# Patient Record
Sex: FEMALE | Race: WHITE | Employment: STUDENT | URBAN - METROPOLITAN AREA
[De-identification: names, ages, dates, MRNs, and addresses within clinical notes are randomized per-mention and may not be internally consistent; named-entity substitution may affect disease eponyms.]

---
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
## 2021-04-13 ENCOUNTER — OFFICE VISIT (OUTPATIENT)
Dept: FAMILY MEDICINE CLINIC | Age: 21
End: 2021-04-13
Payer: COMMERCIAL

## 2021-04-13 VITALS
HEIGHT: 68 IN | WEIGHT: 165 LBS | DIASTOLIC BLOOD PRESSURE: 80 MMHG | OXYGEN SATURATION: 99 % | SYSTOLIC BLOOD PRESSURE: 120 MMHG | HEART RATE: 83 BPM | BODY MASS INDEX: 25.01 KG/M2

## 2021-04-13 DIAGNOSIS — J35.8 TONSIL STONE: Primary | ICD-10-CM

## 2021-04-13 DIAGNOSIS — J35.1 TONSILLAR ENLARGEMENT: ICD-10-CM

## 2021-04-13 DIAGNOSIS — J03.90 TONSILLITIS: ICD-10-CM

## 2021-04-13 DIAGNOSIS — R06.83 SNORING: ICD-10-CM

## 2021-04-13 PROBLEM — F32.A DEPRESSION: Status: ACTIVE | Noted: 2020-07-12

## 2021-04-13 PROCEDURE — 99213 OFFICE O/P EST LOW 20 MIN: CPT | Performed by: INTERNAL MEDICINE

## 2021-04-13 RX ORDER — METHYLPREDNISOLONE 4 MG/1
TABLET ORAL
Qty: 1 KIT | Refills: 0 | Status: SHIPPED | OUTPATIENT
Start: 2021-04-13 | End: 2021-04-19

## 2021-04-13 RX ORDER — AMOXICILLIN 500 MG/1
500 CAPSULE ORAL 3 TIMES DAILY
Qty: 21 CAPSULE | Refills: 0 | Status: SHIPPED | OUTPATIENT
Start: 2021-04-13 | End: 2021-04-20

## 2021-04-13 ASSESSMENT — ENCOUNTER SYMPTOMS
SORE THROAT: 0
FACIAL SWELLING: 0
VOICE CHANGE: 1
DIARRHEA: 0
CONSTIPATION: 0
ABDOMINAL PAIN: 0
SINUS PAIN: 0
TROUBLE SWALLOWING: 1
SINUS PRESSURE: 0
RHINORRHEA: 0

## 2021-04-13 NOTE — PROGRESS NOTES
Visit Information    Have you changed or started any medications since your last visit including any over-the-counter medicines, vitamins, or herbal medicines? no   Are you having any side effects from any of your medications? -  no  Have you stopped taking any of your medications? Is so, why? -  no    Have you seen any other physician or provider since your last visit? No  Have you had any other diagnostic tests since your last visit? No  Have you been seen in the emergency room and/or had an admission to a hospital since we last saw you? No  Have you had your routine dental cleaning in the past 6 months? no    Have you activated your [employer] account? If not, what are your barriers?  No:     Patient Care Team:  Lyudmila Drake DO as PCP - General (Family Medicine)  Lyudmila Drake DO as PCP - [facility] Provider    Medical History Review  Past Medical, Family, and Social History reviewed and does contribute to the patient presenting condition    Health Maintenance   Topic Date Due    COVID-19 Vaccine (1) Never done    Chlamydia screen  Never done    Flu vaccine (Season Ended) 12/29/2021 (Originally 9/1/2021)    DTaP/Tdap/Td vaccine (5 - Td) 08/14/2029    Hepatitis A vaccine  Completed    Hepatitis B vaccine  Completed    Hib vaccine  Completed    HPV vaccine  Completed    Varicella vaccine  Completed    Meningococcal (ACWY) vaccine  Completed    Pneumococcal 0-64 years Vaccine  Aged Out    Hepatitis C screen  Discontinued    HIV screen  Discontinued

## 2021-04-13 NOTE — PROGRESS NOTES
[address] [facility]  [address]  [address] [facility] [address]  Dept: [phone]  Dept Fax: [phone]    Julio Martinezis a 24 y.o. female who presents today for her medical conditions/complaints as notedbelow. Gee Martinez is c/o of   Chief Complaint   Patient presents with    Oral Swelling     patient is complaining of getting tonsil stones alot lately, making her tonsils really swollen but not sore          HPI:     Pt her to tonsils stones  ongoing , progressively worsening over months   No real sore throat but some times irritated  No sinus sxs otherwise  Tonsils do get very enlarged at times   Has notcied or boyfriend notices she snores a lot more now also , no apnea   Has both t and a still   No ent sx  No f/c         No results found for: LABA1C      ( goal A1C is < 7)   No results found for: LABMICR  No results found for: LDLCHOLESTEROL, LDLCALC    (goal LDL is <100)   No results found for: AST, ALT, BUN  BP Readings from Last 3 Encounters:   04/13/21 120/80   12/29/20 110/72   12/18/20 108/64          (goal 120/80)    Past Medical History:   Diagnosis Date    Pneumonia       History reviewed. No pertinent surgical history.     Family History   Problem Relation Age of Onset    No Known Problems Mother     No Known Problems Father     No Known Problems Brother        Social History     Tobacco Use    Smoking status: Never Smoker    Smokeless tobacco: Never Used   Substance Use Topics    Alcohol use: No      Current Outpatient Medications   Medication Sig Dispense Refill    amoxicillin (AMOXIL) 500 MG capsule Take 1 capsule by mouth 3 times daily for 7 days 21 capsule 0    methylPREDNISolone (MEDROL, ISABEL,) 4 MG tablet Take as directed 1 kit 0    norethindrone-ethinyl estradiol-iron (SHAKILA FE 1.5/30) 1.5-30 MG-MCG tablet take 1 tablet by mouth once daily 252 tablet 5    valACYclovir (VALTREX) 500 MG tablet take 1 tablet by mouth once daily 90 tablet 5     Current Facility-Administered Medications   Medication Dose Route Frequency Provider Last Rate Last Admin    hpv vaccine (GARDASIL) injection 0.5 mL  0.5 mL Intramuscular Once Lexi Kocher, DO         Allergies   Allergen Reactions    No Known Allergies           Health Maintenance   Topic Date Due    COVID-19 Vaccine (1) Never done    Chlamydia screen  Never done    Flu vaccine (Season Ended) 12/29/2021 (Originally 9/1/2021)    DTaP/Tdap/Td vaccine (5 - Td) 08/14/2029    Hepatitis A vaccine  Completed    Hepatitis B vaccine  Completed    Hib vaccine  Completed    HPV vaccine  Completed    Varicella vaccine  Completed    Meningococcal (ACWY) vaccine  Completed    Pneumococcal 0-64 years Vaccine  Aged Out    Hepatitis C screen  Discontinued    HIV screen  Discontinued       Subjective:     Review of Systems   Constitutional: Negative for activity change, appetite change, chills, diaphoresis, fatigue, fever and unexpected weight change. HENT: Positive for trouble swallowing and voice change. Negative for congestion, ear discharge, ear pain, facial swelling, hearing loss, postnasal drip, rhinorrhea, sinus pressure, sinus pain, sneezing, sore throat and tinnitus. Eyes: Negative for visual disturbance. Cardiovascular: Negative for chest pain. Gastrointestinal: Negative for abdominal pain, constipation and diarrhea. Musculoskeletal: Negative for arthralgias. Skin: Negative for rash. Allergic/Immunologic: Positive for environmental allergies. Neurological: Negative for headaches. Psychiatric/Behavioral: Negative for sleep disturbance. Objective:      Physical Exam  Vitals signs and nursing note reviewed. Constitutional:       General: She is not in acute distress. Appearance: She is not ill-appearing, toxic-appearing or diaphoretic. HENT:      Head: Normocephalic and atraumatic.       Right Ear: Tympanic membrane, ear canal and external ear normal.      Left Ear: Tympanic membrane, ear canal and external ear normal.      Nose: Nose normal.      Right Sinus: No maxillary sinus tenderness or frontal sinus tenderness. Left Sinus: No maxillary sinus tenderness or frontal sinus tenderness. Mouth/Throat:      Lips: Pink. Mouth: Mucous membranes are moist.      Pharynx: Uvula midline. Tonsils: Tonsillar exudate present. 4+ on the right. 3+ on the left. Neck:      Musculoskeletal: Normal range of motion and neck supple. Cardiovascular:      Rate and Rhythm: Normal rate and regular rhythm. Pulses: Normal pulses. Heart sounds: Normal heart sounds. Pulmonary:      Effort: Pulmonary effort is normal.      Breath sounds: Normal breath sounds. Neurological:      General: No focal deficit present. Mental Status: She is alert and oriented to person, place, and time. Psychiatric:         Mood and Affect: Mood normal.         Thought Content: Thought content normal.         Judgment: Judgment normal.       /80   Pulse 83   Ht 5' 8\" (1.727 m)   Wt 165 lb (74.8 kg)   SpO2 99%   BMI 25.09 kg/m²     Assessment:       Diagnosis Orders   1. Tonsil stone  Myrna Moses MD, Otolaryngology, [place]   2. Tonsillitis  Myrna Moses MD, Otolaryngology, [place]   3. Tonsillar enlargement  Myrna Moses MD, Otolaryngology, [place]   4. Snoring               Plan:       No follow-ups on file.     Orders Placed This Encounter   Procedures   Royal Ju MD, Otolaryngology, [place]     Referral Priority:   Routine     Referral Type:   Eval and Treat     Referral Reason:   Specialty Services Required     Referred to Provider:   Jayme Salazar MD     Requested Specialty:   Otolaryngology     Number of Visits Requested:   1     Orders Placed This Encounter   Medications    amoxicillin (AMOXIL) 500 MG capsule     Sig: Take 1 capsule by mouth 3 times daily for 7 days     Dispense:  21 capsule     Refill:  0    methylPREDNISolone (MEDROL, ISABEL,) 4 MG tablet     Sig: Take as directed     Dispense:  1 kit     Refill:  0    tonsils very hypertrophies , touching today on exam   Will refer to ENT  Did treat tonsillitis acutely with abx and steroid for one wee, reviewed SE with patient but does need to see specialist due to sxs and reoccurrence      Patientgiven educational materials - see patient instructions. Discussed use, benefit,and side effects of prescribed medications. All patient questions answered. Ptvoiced understanding. Reviewed health maintenance. Instructed to continue currentmedications, diet and exercise. Patient agreed with treatment plan. Follow up asdirected.      Electronically signed by Cynthia Escamilla DO on 4/13/2021 at 12:42 PM

## 2021-06-02 ENCOUNTER — NURSE TRIAGE (OUTPATIENT)
Dept: OTHER | Facility: CLINIC | Age: 21
End: 2021-06-02

## 2021-06-02 NOTE — TELEPHONE ENCOUNTER
Received call from [place] at [facility]-service center [facility]) [place] with [employer] Complaint. Brief description of triage: Patient states she fell last night, hitting head on desk. Slight bruising to right side of face near eye, with large amount of swelling to right cheekbone. Denies vision changes or any neurological symptoms at this time. Triage indicates for patient to go to [address] [place] [address] or to office with PCP approval.    [address], 2nd level triage completed with LORRAINE Leiva; provider recommends patient be seen in ED. Advised patient, and she agrees to go to ED. Care advice provided, patient verbalizes understanding; denies any other questions or concerns; instructed to call back for any new or worsening symptoms. Attention Provider: Thank you for allowing me to participate in the care of your patient. The patient was connected to triage in response to information provided to the [facility]. Please do not respond through this encounter as the response is not directed to a shared pool. Reason for Disposition   Large swelling or bruise > 2 inches (5 cm)    Answer Assessment - Initial Assessment Questions  1. MECHANISM: \"How did the injury happen? \" For falls, ask: \"What height did you fall from? \" and \"What surface did you fall against?\"       Toi Jurist after drinking alcohol, hitting right side of face on desk    2. ONSET: \"When did the injury happen? \" (Minutes or hours ago)       Last night    3. NEUROLOGIC SYMPTOMS: \"Was there any loss of consciousness? \" \"Are there any other neurological symptoms? \"       Unsure, doesn't remember hitting the desk    4. MENTAL STATUS: \"Does the person know who he is, who you are, and where he is? \"       Patient seems alert and oriented during triage call    5. LOCATION: \"What part of the head was hit? \"       Right side of face    6. SCALP APPEARANCE: \"What does the scalp look like? Is it bleeding now? \" If so, ask: \"Is it difficult to stop? \" Laceration on right side of face below eye, has swelling to eye and below eye    7. SIZE: For cuts, bruises, or swelling, ask: \"How large is it? \" (e.g., inches or centimeters)       About 1/2 inch    8. PAIN: \"Is there any pain? \" If so, ask: \"How bad is it? \"  (e.g., Scale 1-10; or mild, moderate, severe)      No    9. TETANUS: For any breaks in the skin, ask: \"When was the last tetanus booster? \"      Unsure    10. OTHER SYMPTOMS: \"Do you have any other symptoms? \" (e.g., neck pain, vomiting)        No    11. PREGNANCY: \"Is there any chance you are pregnant? \" \"When was your last menstrual period? \"        No    Protocols used: HEAD INJURY-ADULT-[place]

## 2021-06-30 ENCOUNTER — TELEPHONE (OUTPATIENT)
Dept: FAMILY MEDICINE CLINIC | Age: 21
End: 2021-06-30

## 2021-06-30 NOTE — TELEPHONE ENCOUNTER
----- Message from Nura Guerra sent at 6/30/2021 10:28 AM EDT -----  Subject: Message to Provider    QUESTIONS  Information for Provider? Patient said she is experiencing UTI infection   symptoms. She is wondering if the doctor can prescribe her with meds   without her coming in. I told her I will start with the message then go   from there as she did not want to schedule an appt at this time. Can   someone please follow up. Thank You.   ---------------------------------------------------------------------------  --------------  Jose TOWNSEND  What is the best way for the office to contact you? OK to leave message on   voicemail  Preferred Call Back Phone Number? [phone]  ---------------------------------------------------------------------------  --------------  SCRIPT ANSWERS  Relationship to Patient?  Self

## 2021-08-06 NOTE — PROGRESS NOTES
Infectious Diseases Daily Progress Note      Milton Constantino  Date of Service: 8/6/2021   Hospital Day: 3  Principal Diagnosis:                            This is  Milton Constantino who is a 56 year old  male admitted 8/4/2021  2:12 AM     1. covid-19.   2. No hypoxemic. Borderline oxygenation on RA.   3. Increased inflammatory markers.   4. History of CAD with troponin leak now.   5. Sepsis from above.   6. History of DM2, CHF, HTN. Depression.   7. Abnormal UA. Rule out UTI. He has no symptoms.    Current antimicrobials:                             Remdesivir.     Scheduled Medications furosemide, 20 mg, Daily  losartan, 12.5 mg, Daily  insulin glargine, 10 Units, Daily  carvedilol, 6.25 mg, BID WC  clopidogrel, 75 mg, Daily  sodium chloride (PF), 2 mL, 2 times per day  enoxaparin, 40 mg, Daily  atorvastatin, 40 mg, Daily  insulin regular (human), , TID AC  melatonin, 6 mg, Nightly  zinc sulfate, 220 mg, Daily with breakfast  remdesivir 100 mg/250 mL NS IVPB, 100 mg, Daily at noon  dexamethasone, 6 mg, Daily with breakfast  ascorbic acid, 500 mg, Daily  cholecalciferol, 25 mcg, Daily        PMHx, Social Hx , Medications and Allergies reviewed.    Reviewed Pertinent: Laboratory studies, radiographic studies, medications, and recent progress notes.    Subjective: feels well. Wants to go home.     ROS: No fever, chills, no nausea or abd pain, diarrhea  , No rashes     Objective:     Vital Signs:   Visit Vitals  /64 (BP Location: RUE - Right upper extremity)   Pulse (!) 56   Temp 98.1 °F (36.7 °C) (Oral)   Resp 19   Ht 5' 5\" (1.651 m)   Wt 82.2 kg   SpO2 100%   BMI 30.16 kg/m²      Temp  Min: 97.5 °F (36.4 °C)  Max: 98.1 °F (36.7 °C)     Physical Exam:    O2 sat: 100 on  RA      Evaluated remotely due to covid-19. To avoid exposure, more now with Delta variant and decreased vaccine effectiveness.      Exam below per prior examiner.      General:  Alert and average build.  No distress.   HEENT:  SARAHY, no  conjunctival pallor, ear canals clear, no nasal discharge, oral mucosa moist, no tonsillar enlargement or erythema of posterior pharynx.  Neck:  No masses, no thyromegaly.  Lungs:  B/L air entry equal.  No wheezes.  No crepitation.  No dullness on percussion.  Cardiovascular:  Normal S1 and S2.  No S3 or S4.  No murmurs.  No gallops.  Abdomen:   Soft, nontender, nondistended.  No hepatosplenomegaly.  Bowel sounds are heard in all quadrants.  Musculoskeletal:   No edema in Rt or Lt leg.  Normal ROM B/L.  Skin:  Warm and perfused.  No rashes.  No decubitus ulcers.  Neuropsych:  CN 2-12 intact.  Sensory intact in all 4 extremities and face.  Muscle strength 5/5 all 4 extremities.  Awake, alert, oriented x3 .  Lymph:  No cervical, supraclavicular or axillary lymphadenopathy.     Labs Reviewed    Reviewed     Recent Labs   Lab 08/06/21  0500 08/05/21  1022 08/05/21  0412 08/04/21  0934 08/04/21  0526 08/04/21  0225   SODIUM 136  --  137 135 135 134*   POTASSIUM 4.7 4.7 5.2* 4.5 4.7 4.8   CHLORIDE 102  --  101 100 100 99   CO2 26  --  28 27 25 26   ANIONGAP 13  --  13 13 15 14   BUN 32*  --  24* 22* 20 19   CREATININE 0.90  --  1.05 1.07 1.16 1.18*   GLUCOSE 261*  --  315* 226* 168* 164*   CALCIUM 9.0  --  9.0 8.8 8.9 8.9   ALBUMIN 2.7*  --  2.7* 2.9* 2.9* 3.2*   MG  --   --   --   --   --  2.1   AST 34  --  39* 41* 43* 53*   GPT 37  --  34 34 31 36   ALKPT 47  --  43* 40* 39* 40*   BILIRUBIN 0.4  --  0.6 0.9 1.0 1.1*   LIPA  --   --   --   --   --  222   CRP 6.0*  --   --   --  9.4*  --     Recent Labs   Lab 08/06/21  0500 08/04/21  0934 08/04/21  0526 08/04/21 0225 08/04/21 0222   WBC  --   --  2.7*  --  3.1*   HGB  --   --  10.8*  --  11.8*   HCT  --   --  32.4*  --  35.1*   PLT  --   --  147  --  161   RAPDTR  --  0.14* 0.17* 0.17*  --    CRP 6.0*  --  9.4*  --   --    )       No results found for: ZAKR, ZAKT, ZAKP  Recent Labs     08/04/21  0526 08/06/21  0500   CRP 9.4* 6.0*       URINALYSIS  Recent Labs   Lab  08/04/21  0315   USPG 1.012   UPH 5.0   UKET Negative   UPROT 100 *   UGLU Negative   UBILI Negative   UROB 4.0*   UWBC Moderate*   UNITR Negative   URBC Small*             IMAGES     CXR 8/4: 1. Multifocal patchy consolidations concerning for atypical or viral  infectious process. While this finding is nonspecific, it can be seen in  Covid pneumonia.     MICROBIOLOGY:   Results for LEISA SONI (MRN [id number]) as of 8/4/2021 07:57    Ref. Range 8/4/2021 02:22   Ferritin Latest Ref Range: 26 - 388 ng/mL 752 (H)   Results for LEISA SONI (MRN [id number]) as of 8/4/2021 07:57    Ref. Range 8/4/2021 04:31   DDIMER, QUANTITATIVE Latest Ref Range: <0.57 mg/L (FEU) 1.76 (H)   Results for LEISA SONI (MRN [id number]) as of 8/4/2021 07:57    Ref. Range 8/4/2021 02:28   SARS CoV 2 Qualitative RT PCR Latest Ref Range: Not Detected / Detected / Presumptive Positive / Inhibitors present  Detected (A)      Urine culture 8/4: pending. UA positive.         ASSESSMENT:      56 year old gentleman with PMH as above, here with      1. covid-19.   2. No hypoxemic. Borderline oxygenation on RA.   3. Increased inflammatory markers.   4. History of CAD with troponin leak now.   5. Sepsis from above.   6. History of DM2, CHF, HTN. Depression.   7. Abnormal UA. Rule out UTI. He has no symptoms.   8. Bradycardia      Plan:     1. Continue  Remdesivir therapy. D 3/5  To follow creatinine and LFTs closely. All stable.   He ideally should complete 5 days course.   If any need to vacate his bed or patient wanting to go, this may be fine though not recommended fully.     2.Steroids. Per Recovery protocol.   3. Follow inflammation markers. Oxygenation closely    O2 sat: 100 on  RA   Ferritin 752  CRP: 6.0 decreased     4. Adjuvants.   5. Continue ceftriaxone for now until more information available.   UA contaminated.     EKG done. Potassium is normal. Will check Mg. TSH    Discussed in detail about his vaccination. He wants it. Will try to  person, place, and time. She appears well-developed and well-nourished. Non-toxic appearance. She does not have a sickly appearance. She does not appear ill. No distress. HENT:   Right Ear: Hearing, tympanic membrane, external ear and ear canal normal.   Left Ear: Hearing, tympanic membrane, external ear and ear canal normal.   Nose: Nose normal.   Mouth/Throat: Oropharynx is clear and moist and mucous membranes are normal. No oropharyngeal exudate. Eyes: Pupils are equal, round, and reactive to light. EOM are normal.   Neck: Trachea normal, full passive range of motion without pain and phonation normal. Neck supple. No JVD present. Carotid bruit is not present. Cardiovascular: Normal rate, regular rhythm, S1 normal, S2 normal, normal heart sounds and intact distal pulses. No extrasystoles are present. PMI is not displaced. Exam reveals no gallop and no friction rub. No murmur heard. Pulmonary/Chest: Effort normal and breath sounds normal. No accessory muscle usage or stridor. No apnea, no tachypnea and no bradypnea. No respiratory distress. She has no wheezes. She has no rales. She exhibits no tenderness. Abdominal: Soft. Bowel sounds are normal. She exhibits no distension, no fluid wave and no mass. There is no splenomegaly or hepatomegaly. There is no tenderness. There is no rigidity, no rebound, no guarding, no CVA tenderness and negative Almendarez's sign. Neurological: She is alert and oriented to person, place, and time. She has normal strength and normal reflexes. No cranial nerve deficit or sensory deficit. Gait normal.   Skin: Skin is warm and dry. No rash noted. She is not diaphoretic. Psychiatric: She has a normal mood and affect. Her speech is normal and behavior is normal. Thought content is not delusional. She expresses no suicidal plans. Nursing note and vitals reviewed.     /62 (Site: Right Upper Arm, Position: Sitting, Cuff Size: Medium Adult)   Pulse 79   Temp 98.1 °F (36.7 schedule on discharge. When he recovers of this acute episode. He does not have to wait 90 days.      No other recommendations, will follow. Discussed with Dr Higgins.             Trell Pierre M.D.  Infectious Diseases  Pager: [phone]  8/6/2021  7:38 AM     °C) (Tympanic)   Resp 16   Ht 5' 8\" (1.727 m)   Wt 170 lb 6.4 oz (77.3 kg)   LMP 06/18/2019 (Exact Date)   SpO2 99%   BMI 25.91 kg/m²     Assessment:       Diagnosis Orders   1. Encounter for well adult exam without abnormal findings     2. Need for HPV vaccination  HPV vaccine quadravalent IM (GARDASIL)   3. Encounter for contraceptive management, unspecified type  POCT urine pregnancy             Plan:       No follow-ups on file. Orders Placed This Encounter   Procedures    HPV vaccine quadravalent IM (GARDASIL)    POCT urine pregnancy     Orders Placed This Encounter   Medications    norethindrone-ethinyl estradiol-iron (LOESTRIN FE 1.5/30) 1.5-30 MG-MCG tablet     Sig: Take 1 tablet by mouth daily     Dispense:  1 packet     Refill:  3    Start birth control   Instructed on how to take   Once daily   If missed more than one dose will need to restart entire cycle  Not covered until completed entire first month   Needs back up form ie condoms  Also this does not cover against stis, needs condoms   Urien preg negative   Start hpv series  second dose week of august 12th due to patient needing to head to school on the 15th . Call with q/c  Reviewed breakthrough bleeding first few months on new ocp ie first three months. Call with q/c. Patientgiven educational materials - see patient instructions. Discussed use, benefit,and side effects of prescribed medications. All patient questions answered. Ptvoiced understanding. Reviewed health maintenance. Instructed to continue currentmedications, diet and exercise. Patient agreed with treatment plan. Follow up asdirected.      Electronically signed by Balta Youngblood DO on 6/20/2019 at 6:54 PM

## 2021-10-05 ENCOUNTER — OFFICE VISIT (OUTPATIENT)
Dept: OBGYN CLINIC | Age: 21
End: 2021-10-05
Payer: COMMERCIAL

## 2021-10-05 VITALS
BODY MASS INDEX: 23.79 KG/M2 | SYSTOLIC BLOOD PRESSURE: 108 MMHG | DIASTOLIC BLOOD PRESSURE: 62 MMHG | WEIGHT: 157 LBS | HEIGHT: 68 IN

## 2021-10-05 DIAGNOSIS — Z01.419 ENCOUNTER FOR GYNECOLOGICAL EXAMINATION: Primary | ICD-10-CM

## 2021-10-05 PROCEDURE — 99395 PREV VISIT EST AGE 18-39: CPT | Performed by: PHYSICIAN ASSISTANT

## 2021-10-05 NOTE — PROGRESS NOTES
[facility]  [address] [facility],  [facility]    Carolyn Robbins  10/5/2021                       Primary Care Physician: Karolina Gallagher DO    CC:   Chief Complaint   Patient presents with   [address] taking her OCP 2 months ago for a break, LMP was very heavy         HPI: Carolyn Robbins is a 22 y.o. female No obstetric history on file. Patient's last menstrual period was 09/01/2021. The patient was seen and examined. She is here for an annual visit. She is complaining of nothing. She was previously on the OCP and she has stopped using the pill over the last 2 months because she \"forgets and was scared about taking them\". She is interested in other forms of contraception. She denies irregular/heavy bleeding and dysmenorrhea. Her periods are regular and last 7 days. She describes them as heavy. Her bowel habits are regular. She denies any bloating. She denies dysuria. She denies urinary leaking. She denies vaginal discharge. She is sexually active with single partner, contraception - none. She uses no method for contraception and is not desiring pregnancy.     Depression Screen: Symptoms of decreased mood absent  Symptoms of anhedonia absent  **If either question is answered in a  positive fashion then complete the PHQ9 Scoring Evaluation and make the appropriate referral**    REVIEW OF SYSTEMS:   Constitutional: negative fever, negative chills  HEENT: negative visual disturbances, negative headaches  Respiratory: negative dyspnea, negative cough  Cardiovascular: negative chest pain,  negative palpitations  Gastrointestinal: negative abdominal pain, negative RUQ pain, negative N/V, negative diarrhea, negative constipation  Genitourinary: negative dysuria, negative vaginal discharge  Dermatological: negative rash  Hematologic: negative bruising  Immunologic/Lymphatic: negative recent illness, negative recent sick contact  Musculoskeletal: negative back pain, negative myalgias, negative arthralgias  Neurological:  negative dizziness, negative weakness  Behavior/Psych: negative depression, negative anxiety      GYNECOLOGICAL HISTORY:  Age of Menarche: 16  LMP: 2021    Sexually Active: single partner, contraception - none  STD History: past history: herpes simplex virus 1 and chlamydia     Pap History: Patient has never had a Pap test.  Colposcopy History: denies    Permanent Sterilization: no  Reversible Birth Control: no  Hormone Replacement Exposure: no    OBSTETRICAL HISTORY:  OB History    Para Term  AB Living   0 0 0 0 0 0   SAB TAB Ectopic Molar Multiple Live Births   0 0 0 0 0 0       PAST MEDICAL HISTORY:   has a past medical history of Pneumonia. PAST SURGICAL HISTORY:   has a past surgical history that includes Tonsillectomy (). ALLERGIES:  is allergic to no known allergies. MEDICATIONS:  Prior to Admission medications    Medication Sig Start Date End Date Taking? Authorizing Provider   valACYclovir (VALTREX) 500 MG tablet take 1 tablet by mouth once daily 20  Yes Don Curiel DO   norethindrone-ethinyl estradiol-iron Heike Aus FE 1.5/30) 1.5-30 MG-MCG tablet take 1 tablet by mouth once daily  Patient not taking: Reported on 10/5/2021 12/29/20   Don Curiel DO       FAMILY HISTORY:  Family History of Breast, Ovarian, Colon or Uterine Cancer: No   family history includes No Known Problems in her brother, father, and mother. SOCIAL HISTORY:   reports that she has never smoked. She has never used smokeless tobacco. She reports current alcohol use. She reports that she does not use drugs.     HEALTH MAINTENANCE:  Immunization status: up to date and documented  [facility] immunization: results reviewed    Date of Last Mammogram: patient has never had a mammogram  Date of Last Colonoscopy: N/A  Date of Last Bone Density: N/A    VITALS:  Vitals:    10/05/21 1453   BP: 108/62   Position: Sitting   Cuff Size: Medium Adult   Weight: 157 lb (71.2 kg)   Height: 5' 8\" (1.727 m)                                                                                                                                                          PHYSICAL EXAM:   General Appearance: Appears healthy. Alert; in no acute distress. Pleasant. Skin: Skin color, texture, turgor normal. No rashes or lesions. HEENT: normocephalic and atraumatic, Thyroid normal to inspection and palpation  Respiratory: No chest wall tenderness. Cardiovascular: normal rate, normal S1 and S2 and no gallops  Breast:  (Chest): normal appearance, no masses or tenderness, Inspection negative, No nipple retraction or dimpling, No nipple discharge or bleeding, No axillary or supraclavicular adenopathy  Abdomen: soft, non-tender, non-distended and no right upper quadrant tenderness, no abdominal scars  Pelvic Exam: Deferred today, no concerns. Rectal Exam: exam declined by patient  Musculoskeletal: no gross abnormalities  Extremities: non-tender BLE and non-edematous  Psych:  oriented to time, place and person, mood and affect are within normal limits, pt is a good historian; no memory problems were noted     DATA:  No results found for this visit on 10/05/21. ASSESSMENT & PLAN:    James Sloan is a 22 y.o. female No obstetric history on file. Patient's last menstrual period was 09/01/2021.  - Discussed contraceptive management, patient is interested in C/ [address]. Information packet given and may call to schedule   - She previously received the Gar[facility] immunization    Patient Active Problem List    Diagnosis Date Noted    Depression 07/12/2020    Herpes simplex type 1 infection 09/03/2019       No follow-ups on file. No Patient Care Coordination Note on file. Counseling Completed:    discussed need for repeat pap as per American Society for Colposcopy and Cervical Pathology guidelines. discussed need for mammograms every 1 year, If >44 yo and last mammogram was negative.   discussed Calcium and Vitamin D dosing. discussed need for colonoscopy screening as well as onset for bone density testing. discussed birth control and barrier recommendations. discussed STD counseling and prevention. discussed Gardisil counseling for all patients 9-25 yo. Hereditary Breast, Ovarian, Colon and Uterine Cancer screening discussed. Tobacco & Secondary smoke risks discussed; with recommendation for cessation and avoidance. Routine health maintenance per patients PCP discussed. Patient was seen with total face to face time of 20 minutes. More than 50% of this visit was on counseling and education regarding the problems listed below and her options. She was also counseled on her preventative health maintenance recommendations and follow-up. Diagnosis Orders   1.  Encounter for gynecological examination          Park Hawley PA-C   [facility]   10/5/2021, 3:45 PM

## 2021-10-24 NOTE — PROGRESS NOTES
INSERTION OF NEXPLANON    10/25/2021  Linorlando Martinez  No LMP recorded. 22 y.o. Social History     Tobacco Use   Smoking Status Never Smoker   Smokeless Tobacco Never Used         Patient desires insertion of Nexplanon. Risks and benefits discussed. Patient verbalizes understanding. She has reviewed the handouts, and all questions have been answered. She is noton her menses. Cycle ended last week. Denies unprotected intercourse    The patient was positioned in supine position  on the exam table with her left arm bent up. A kristina was made  Approximately 8 cm superior to the medial epicondyl of the humerus. The area was cleansed with betadine and allowed to dry. The track of insertion was infiltrated with 1% xylocaine. The Nexplanon  was used to insert the device just under the skin without difficulty. At the end, the Nexplanon was palpable under the skin. The betadine was wiped clean and a pressure dressing applied to the insertion site. The patient tolerated the procedure well. She will return as needed or for her next annual visit.     Lot Number: [id number]  Expiration:

## 2021-10-25 ENCOUNTER — PROCEDURE VISIT (OUTPATIENT)
Dept: OBGYN CLINIC | Age: 21
End: 2021-10-25
Payer: COMMERCIAL

## 2021-10-25 VITALS
WEIGHT: 158.8 LBS | BODY MASS INDEX: 24.07 KG/M2 | HEIGHT: 68 IN | DIASTOLIC BLOOD PRESSURE: 72 MMHG | SYSTOLIC BLOOD PRESSURE: 104 MMHG

## 2021-10-25 DIAGNOSIS — Z30.017 INSERTION OF NEXPLANON: Primary | ICD-10-CM

## 2021-10-25 PROCEDURE — 11981 INSERTION DRUG DLVR IMPLANT: CPT | Performed by: NURSE PRACTITIONER

## 2021-10-25 NOTE — PATIENT INSTRUCTIONS
Patient Education        Implant for Birth Control: Care Instructions  Your Care Instructions     The implant is used to prevent pregnancy. It's a thin dragan about the size of a matchstick that is inserted under the skin (subdermal) on the inside of your arm. The implant prevents pregnancy for 3 years. After it is put in, you don't have to do anything else to prevent pregnancy. Follow-up care is a key part of your treatment and safety. Be sure to make and go to all appointments, and call your doctor if you are having problems. It's also a good idea to know your test results and keep a list of the medicines you take. How can you care for yourself at home? How do you use the subdermal implant? · The implant is put in by your doctor or another trained health professional. It only takes a few minutes. This can also be done right after you give birth. Your doctor will remove the implant when it needs to be taken out. · An adhesive bandage and a tight (pressure) bandage will be placed over the site. This helps reduce swelling and bruising. · Ask your doctor if you need to use backup birth control, such as a condom, for a week after insertion. Whether you need to do this depends on where you are in your cycle. How can you care for the insertion site? · Remove the pressure bandage after 24 hours. Keep the area dry. · Keep an adhesive bandage on the site for 3 to 5 days after the procedure. · If you have pain, use an ice pack or take an over-the-counter pain medicine. Some soreness or bruising is normal.  What else do you need to know? · It's safe to use while breastfeeding. · The implant has side effects. ? You may have changes in your period. Your period may stop. You may also have spotting or bleeding between periods. ? You may have mood changes, less interest in sex, or weight gain. · The implant can stay in place for up to 3 years to prevent pregnancy.  But your doctor may talk to you about leaving it in for longer. ? If you don't replace the implant and don't use another form of birth control, you could get pregnant. ? If you have the implant removed, you'll have to find another method of birth control. If you don't, you may get pregnant. ? Even if you are planning to get pregnant, you have to have the implant removed. · Check with your doctor before you use any other medicines. This includes over-the-counter medicines, vitamins, herbal products, and supplements. Birth control hormones may not work as well to prevent pregnancy when combined with other medicines. · The implant doesn't protect against sexually transmitted infections (STIs), such as herpes or HIV/AIDS. If you're not sure if your sex partner might have an STI, use a condom to protect against infection. When should you call for help? Call 911  anytime you think you may need emergency care. For example, call if:    · You have shortness of breath.     · You have chest pain.     · You passed out (lost consciousness).     · You cough up blood. Call your doctor now or seek immediate medical care if:    · You have severe pain or numbness and tingling in the arm where the implant was inserted.     · You have increased pain, swelling, warmth, or redness at the insertion site.     · You have signs of a blood clot in your leg (called a deep vein thrombosis), such as:  ? Pain in your calf, back of the knee, thigh, or groin. ? Redness and swelling in your leg. Watch closely for changes in your health, and be sure to contact your doctor if:    · You can't feel your implant.     · You think your implant might be bent or broken in your arm.     · You think you might be pregnant.     · You have any problems with your birth control method. Where can you learn more? Go to [email or web address]://tami.[email or web address]. org and sign in to your [employer] account.  Enter M097 in the Caravan box to learn more about \"Implant for Birth Control: Care Instructions. \"     If you do not have an account, please click on the \"Sign Up Now\" link. Current as of: June 16, 2021               Content Version: 13.0  © 7885-7538 [employer]wise, Incorporated. Care instructions adapted under license by [facility] ([facility]). If you have questions about a medical condition or this instruction, always ask your healthcare professional. [address] any warranty or liability for your use of this information.

## 2022-02-14 ENCOUNTER — OFFICE VISIT (OUTPATIENT)
Dept: PRIMARY CARE CLINIC | Age: 22
End: 2022-02-14
Payer: COMMERCIAL

## 2022-02-14 ENCOUNTER — HOSPITAL ENCOUNTER (OUTPATIENT)
Age: 22
Setting detail: SPECIMEN
Discharge: HOME | End: 2022-02-14

## 2022-02-14 VITALS
DIASTOLIC BLOOD PRESSURE: 75 MMHG | TEMPERATURE: 97 F | BODY MASS INDEX: 23.95 KG/M2 | HEART RATE: 84 BPM | OXYGEN SATURATION: 100 % | HEIGHT: 68 IN | SYSTOLIC BLOOD PRESSURE: 110 MMHG | WEIGHT: 158 LBS

## 2022-02-14 DIAGNOSIS — R10.31 ABDOMINAL CRAMPING, BILATERAL LOWER QUADRANT: ICD-10-CM

## 2022-02-14 DIAGNOSIS — R11.0 NAUSEA: ICD-10-CM

## 2022-02-14 DIAGNOSIS — R10.32 ABDOMINAL CRAMPING, BILATERAL LOWER QUADRANT: ICD-10-CM

## 2022-02-14 DIAGNOSIS — N91.2 AMENORRHEA: Primary | ICD-10-CM

## 2022-02-14 LAB
BILIRUBIN, POC: NORMAL
BLOOD URINE, POC: NORMAL
CLARITY, POC: CLEAR
COLOR, POC: YELLOW
CONTROL: NORMAL
GLUCOSE URINE, POC: NORMAL
KETONES, POC: NORMAL
LEUKOCYTE EST, POC: NORMAL
NITRITE, POC: NORMAL
PH, POC: 6
PREGNANCY TEST URINE, POC: NORMAL
PROTEIN, POC: NORMAL
SPECIFIC GRAVITY, POC: 1.02
UROBILINOGEN, POC: NORMAL

## 2022-02-14 PROCEDURE — 81025 URINE PREGNANCY TEST: CPT | Performed by: NURSE PRACTITIONER

## 2022-02-14 PROCEDURE — 81003 URINALYSIS AUTO W/O SCOPE: CPT | Performed by: NURSE PRACTITIONER

## 2022-02-14 PROCEDURE — 99213 OFFICE O/P EST LOW 20 MIN: CPT | Performed by: NURSE PRACTITIONER

## 2022-02-14 ASSESSMENT — PATIENT HEALTH QUESTIONNAIRE - PHQ9
SUM OF ALL RESPONSES TO PHQ QUESTIONS 1-9: 0
2. FEELING DOWN, DEPRESSED OR HOPELESS: 0
SUM OF ALL RESPONSES TO PHQ9 QUESTIONS 1 & 2: 0
SUM OF ALL RESPONSES TO PHQ QUESTIONS 1-9: 0
1. LITTLE INTEREST OR PLEASURE IN DOING THINGS: 0
SUM OF ALL RESPONSES TO PHQ9 QUESTIONS 1 & 2: 0
SUM OF ALL RESPONSES TO PHQ QUESTIONS 1-9: 0
SUM OF ALL RESPONSES TO PHQ QUESTIONS 1-9: 0
1. LITTLE INTEREST OR PLEASURE IN DOING THINGS: 0
SUM OF ALL RESPONSES TO PHQ QUESTIONS 1-9: 0
SUM OF ALL RESPONSES TO PHQ QUESTIONS 1-9: 0
2. FEELING DOWN, DEPRESSED OR HOPELESS: 0
SUM OF ALL RESPONSES TO PHQ QUESTIONS 1-9: 0
SUM OF ALL RESPONSES TO PHQ QUESTIONS 1-9: 0

## 2022-02-14 ASSESSMENT — ENCOUNTER SYMPTOMS
NAUSEA: 1
VOMITING: 0

## 2022-02-14 NOTE — PATIENT INSTRUCTIONS
Push fluids, keep hydrated  Follow up family doctor 1 week after antibiotics completed for urine recheck  Return worse  We will call you if urine culture positive for bacteria and start antibiotic at that time. If urine culture comes back ok - no infection - then call OB/GYN office and let them know your symptoms.

## 2022-02-14 NOTE — LETTER
[address] [address]  Phone: [phone]  Fax: [phone]    PALAK Kay CNP        February 14, 2022     Patient: Esther Guzman   YOB: 2000   Date of Visit: 2/14/2022       To Whom it May Concern:    Gee Martinez was seen in my clinic on 2/14/2022. Please excuse her on 2/14/2022 and she may return on 2/15/2022. If you have any questions or concerns, please don't hesitate to call.     Sincerely,         PALAK Kay CNP

## 2022-02-14 NOTE — PROGRESS NOTES
[address] WALK IN [facility]  [address] [address]  Dept: [phone]  Dept Fax: [phone]    Bethany Yanez is a 22 y.o. female who presents to the urgent care today for her medical conditions/complaints as notedbelow. Gee Martinez is c/o of Urinary Tract Infection (pt has been having cramping and has been having some issues with urinating X 5 days )      HPI:     22 yr old female presents for mild nausea in the mornings and occasional lower abdominal \"period\" cramps with some urinary fx sx for 5 days. Switched from pill to nexplanon. 2-3 months ago. Was having regular periods, told her periods would eventually cease. She is 1 week late for menses. Did home preg test and was negative. Not sure if has UTI, would like to be checked. No concern for std or change in vaginal discharge. Urinary Tract Infection   This is a new problem. The current episode started in the past 7 days (x5d). The problem occurs intermittently. The problem has been waxing and waning. Quality: occasional pelvic cramping. The pain is at a severity of 1/10. The pain is mild. There has been no fever. She is sexually active. There is no history of pyelonephritis. Associated symptoms include frequency, nausea and a possible pregnancy. Pertinent negatives include no chills, discharge, flank pain, hematuria, hesitancy, sweats, urgency or vomiting. She has tried nothing for the symptoms. The treatment provided no relief.        Past Medical History:   Diagnosis Date    Pneumonia         Current Outpatient Medications   Medication Sig Dispense Refill    norethindrone-ethinyl estradiol-iron (SHAKILA FE 1.5/30) 1.5-30 MG-MCG tablet take 1 tablet by mouth once daily 252 tablet 5    valACYclovir (VALTREX) 500 MG tablet take 1 tablet by mouth once daily 90 tablet 5     Current Facility-Administered Medications   Medication Dose Route Frequency Provider Last Rate Last Admin  etonogestrel (NEXPLANON) implant 68 mg  68 mg Subdermal Continuous PALAK Anaya CNP   68 mg at 10/25/21 1654    hpv vaccine (GARDASIL) injection 0.5 mL  0.5 mL IntraMUSCular Once Matthieu Goidnez, DO         Allergies   Allergen Reactions    No Known Allergies        Subjective:      Review of Systems   Constitutional: Negative for chills. Gastrointestinal: Positive for nausea. Negative for vomiting. Genitourinary: Positive for frequency. Negative for flank pain, hematuria, hesitancy and urgency. All other systems reviewed and are negative. 14 systems reviewed and negative except as listed in HPI. Objective:     Physical Exam  Vitals and nursing note reviewed. Constitutional:       General: She is not in acute distress. Appearance: Normal appearance. She is well-developed. She is not ill-appearing, toxic-appearing or diaphoretic. Comments: nontoxic   HENT:      Head: Normocephalic and atraumatic. Right Ear: External ear normal.      Left Ear: External ear normal.   Eyes:      General: No scleral icterus. Right eye: No discharge. Left eye: No discharge. Extraocular Movements: Extraocular movements intact. Conjunctiva/sclera: Conjunctivae normal.      Pupils: Pupils are equal, round, and reactive to light. Cardiovascular:      Rate and Rhythm: Normal rate and regular rhythm. Pulses: Normal pulses. Heart sounds: Normal heart sounds. Pulmonary:      Effort: Pulmonary effort is normal.      Breath sounds: Normal breath sounds. Abdominal:      General: Bowel sounds are normal. There is no distension. Palpations: Abdomen is soft. There is no mass. Tenderness: There is no abdominal tenderness. There is no right CVA tenderness, left CVA tenderness, guarding or rebound. Hernia: No hernia is present. Musculoskeletal:         General: No tenderness or deformity. Normal range of motion. Cervical back: Neck supple.    Skin: General: Skin is warm and dry. Capillary Refill: Capillary refill takes less than 2 seconds. Findings: No rash ( no rash to visible skin). Neurological:      General: No focal deficit present. Mental Status: She is alert and oriented to person, place, and time. Motor: No abnormal muscle tone. Coordination: Coordination normal.   Psychiatric:         Mood and Affect: Mood normal.         Behavior: Behavior normal.       /75 (Site: Left Upper Arm, Position: Sitting, Cuff Size: Medium Adult)   Pulse 84   Temp 97 °F (36.1 °C) (Tympanic)   Ht 5' 8\" (1.727 m)   Wt 158 lb (71.7 kg)   SpO2 100%   Breastfeeding No   BMI 24.02 kg/m²     Assessment:       Diagnosis Orders   1. Amenorrhea     2. Nausea  POCT Urinalysis No Micro (Auto)    POCT urine pregnancy    Culture, Urine   3. Abdominal cramping, bilateral lower quadrant  Culture, Urine       Plan:    nexplanon insertion and d/c BCP in last cpl months  Feeling like menstrual cramps intermittently last 5 days, no vag bleeding, no concern for std  Wants checked for uti - some urinary fx  Results for POC orders placed in visit on 02/14/22   POCT Urinalysis No Micro (Auto)   Result Value Ref Range    Color, UA yellow     Clarity, UA clear     Glucose, UA POC neg     Bilirubin, UA neg     Ketones, UA neg     Spec Grav, UA 1.025     Blood, UA POC trace     pH, UA 6.0     Protein, UA POC neg     Urobilinogen, UA neg     Leukocytes, UA neg     Nitrite, UA neg    POCT urine pregnancy   Result Value Ref Range    Preg Test, Ur neg     Control pos          POCT urine + trace blood  Urine preg neg  Urine culture pending  F/u OB/GYN if culture neg and sx persist  Needs work note for today  No follow-ups on file. No orders of the defined types were placed in this encounter. Patient given educational materials - see patient instructions. Discussed use, benefit, and side effects of prescribed medications. All patient questions answered. Pt voicedunderstanding.     Electronically signed by PALAK Maher CNP on 2/14/2022 at 3:10 PM

## 2022-02-15 LAB
CULTURE: NORMAL
Lab: NORMAL
SPECIMEN DESCRIPTION: NORMAL

## 2022-03-08 ENCOUNTER — TELEMEDICINE (OUTPATIENT)
Dept: FAMILY MEDICINE CLINIC | Age: 22
End: 2022-03-08
Payer: COMMERCIAL

## 2022-03-08 DIAGNOSIS — N30.00 ACUTE CYSTITIS WITHOUT HEMATURIA: Primary | ICD-10-CM

## 2022-03-08 PROCEDURE — 99213 OFFICE O/P EST LOW 20 MIN: CPT | Performed by: NURSE PRACTITIONER

## 2022-03-08 RX ORDER — SULFAMETHOXAZOLE AND TRIMETHOPRIM 800; 160 MG/1; MG/1
1 TABLET ORAL 2 TIMES DAILY
Qty: 14 TABLET | Refills: 0 | Status: SHIPPED | OUTPATIENT
Start: 2022-03-08 | End: 2022-03-15

## 2022-03-08 RX ORDER — FLUCONAZOLE 100 MG/1
TABLET ORAL
Qty: 3 TABLET | Refills: 0 | Status: SHIPPED | OUTPATIENT
Start: 2022-03-08

## 2022-03-08 RX ORDER — VALACYCLOVIR HYDROCHLORIDE 500 MG/1
TABLET, FILM COATED ORAL
Qty: 90 TABLET | Refills: 5 | Status: SHIPPED | OUTPATIENT
Start: 2022-03-08

## 2022-03-08 SDOH — ECONOMIC STABILITY: FOOD INSECURITY: WITHIN THE PAST 12 MONTHS, YOU WORRIED THAT YOUR FOOD WOULD RUN OUT BEFORE YOU GOT MONEY TO BUY MORE.: NEVER TRUE

## 2022-03-08 SDOH — ECONOMIC STABILITY: FOOD INSECURITY: WITHIN THE PAST 12 MONTHS, THE FOOD YOU BOUGHT JUST DIDN'T LAST AND YOU DIDN'T HAVE MONEY TO GET MORE.: NEVER TRUE

## 2022-03-08 ASSESSMENT — SOCIAL DETERMINANTS OF HEALTH (SDOH): HOW HARD IS IT FOR YOU TO PAY FOR THE VERY BASICS LIKE FOOD, HOUSING, MEDICAL CARE, AND HEATING?: NOT HARD AT ALL

## 2022-03-08 NOTE — PROGRESS NOTES
Gee Martinez (:  2000) is a Established patient, here for evaluation of the following:    Assessment & Plan   Below is the assessment and plan developed based on review of pertinent history, physical exam, labs, studies, and medications. 1. Acute cystitis without hematuria  Increase fluids, cranberry PRN  Wipe front to back after BM, urinate before and after sexual intercourse  Start bactrim x 5 days  Call INB or worsening    -     sulfamethoxazole-trimethoprim (BACTRIM DS;SEPTRA DS) 800-160 MG per tablet; Take 1 tablet by mouth 2 times daily for 7 days, Disp-14 tablet, R-0Normal  -     fluconazole (DIFLUCAN) 100 MG tablet; Take 1 tablet x 2 days; then 1 in 72 hours if symptoms persist., Disp-3 tablet, R-0Normal    Return if symptoms worsen or fail to improve. Subjective   HPI   Patient calling in today to establish care with myself. Last PCP letter I will see her. States she is always asked with gynecologist who she sees regularly. States she woke up this morning with urinary urgency hesitancy and frequency. These are symptoms that she often has when she has a urinary tract infection which she has had several times in the past.  States ever since she was diagnosed with herpes type I she has been getting these more frequently. Denies any recent abdominal pain, constipation diarrhea or frequent intercourse. Denies any flank pain, weakness or blood in her urine. Has taken 1 dose of Azo with no major relief and has been trying to push fluids. Would like antibiotic called in if possible and a refill on Valtrex as she takes this once a day for herpes prevention.         Review of Systems       Objective   Patient-Reported Vitals  No data recorded     Physical Exam  [INSTRUCTIONS:  \"[x]\" Indicates a positive item  \"[]\" Indicates a negative item  -- DELETE ALL ITEMS NOT EXAMINED]    Constitutional: [x] Appears well-developed and well-nourished [x] No apparent distress      [] Abnormal -     Mental status: [x] Alert and awake  [x] Oriented to person/place/time [x] Able to follow commands    [] Abnormal -     Eyes:   EOM    [x]  Normal    [] Abnormal -   Sclera  [x]  Normal    [] Abnormal -          Discharge [x]  None visible   [] Abnormal -     HENT: [x] Normocephalic, atraumatic  [] Abnormal -   [x] Mouth/Throat: Mucous membranes are moist    External Ears [x] Normal  [] Abnormal -    Neck: [x] No visualized mass [] Abnormal -     Pulmonary/Chest: [x] Respiratory effort normal   [x] No visualized signs of difficulty breathing or respiratory distress        [] Abnormal -      Musculoskeletal:   [x] Normal gait with no signs of ataxia         [x] Normal range of motion of neck        [] Abnormal -     Neurological:        [x] No Facial Asymmetry (Cranial nerve 7 motor function) (limited exam due to video visit)          [x] No gaze palsy        [] Abnormal -          Skin:        [x] No significant exanthematous lesions or discoloration noted on facial skin         [] Abnormal -            Psychiatric:       [x] Normal Affect [] Abnormal -        [x] No Hallucinations    Other pertinent observable physical exam findings:-    Gee Martinez, was evaluated through a synchronous (real-time) audio-video encounter. The patient (or guardian if applicable) is aware that this is a billable service, which includes applicable co-pays. This Virtual Visit was conducted with patient's (and/or legal guardian's) consent. The visit was conducted pursuant to the emergency declaration under the [address] [place] [address] authority and the [employer] Act. Patient identification was verified, and a caregiver was present when appropriate. The patient was located at home in a state where the provider was licensed to provide care.        --Harriet Rg, APRLISA - CNP

## 2022-06-10 ENCOUNTER — HOSPITAL ENCOUNTER (OUTPATIENT)
Age: 22
Setting detail: SPECIMEN
Discharge: HOME | End: 2022-06-10

## 2022-06-10 ENCOUNTER — OFFICE VISIT (OUTPATIENT)
Dept: PRIMARY CARE CLINIC | Age: 22
End: 2022-06-10
Payer: COMMERCIAL

## 2022-06-10 VITALS
OXYGEN SATURATION: 98 % | TEMPERATURE: 99 F | DIASTOLIC BLOOD PRESSURE: 72 MMHG | SYSTOLIC BLOOD PRESSURE: 103 MMHG | HEART RATE: 105 BPM

## 2022-06-10 DIAGNOSIS — Z20.822 CONTACT WITH AND (SUSPECTED) EXPOSURE TO COVID-19: Primary | ICD-10-CM

## 2022-06-10 PROCEDURE — 99213 OFFICE O/P EST LOW 20 MIN: CPT

## 2022-06-10 ASSESSMENT — ENCOUNTER SYMPTOMS
CHEST TIGHTNESS: 0
CHOKING: 0
RHINORRHEA: 0
COUGH: 0
COLOR CHANGE: 0
EYE REDNESS: 0
DIARRHEA: 0
PHOTOPHOBIA: 0
SWOLLEN GLANDS: 0
EYES NEGATIVE: 1
GASTROINTESTINAL NEGATIVE: 1
STRIDOR: 0
VOMITING: 0
CHANGE IN BOWEL HABIT: 0
ABDOMINAL PAIN: 0
SINUS PRESSURE: 1
BLOOD IN STOOL: 0
FACIAL SWELLING: 0
RESPIRATORY NEGATIVE: 1
ANAL BLEEDING: 0
VOICE CHANGE: 0
EYE DISCHARGE: 0
CONSTIPATION: 0
EYE ITCHING: 0
SORE THROAT: 0
TROUBLE SWALLOWING: 0
NAUSEA: 0
RECTAL PAIN: 0
WHEEZING: 0
BACK PAIN: 0
VISUAL CHANGE: 0
EYE PAIN: 0
SINUS PAIN: 0
APNEA: 0
ABDOMINAL DISTENTION: 0
SHORTNESS OF BREATH: 0

## 2022-06-10 NOTE — PROGRESS NOTES
[address] WALK IN CARE  [facility] [address] [facility] WALK IN CARE  [address] [place] [address] [address] [address]  Dept: Dar Ivey is a 25 y.o. female Established patient, who presents to the walk-in clinic today with conditions/complaints as noted below:    Chief Complaint   Patient presents with    Nasal Congestion     onset 2 days ago, at home covid positive, needs PCR for work    Fatigue         HPI:     Fatigue  This is a new problem. Episode onset: 2 days ago. The problem occurs constantly. The problem has been gradually improving. Associated symptoms include chills, fatigue, a fever, headaches and myalgias. Pertinent negatives include no abdominal pain, anorexia, arthralgias, change in bowel habit, chest pain, congestion, coughing, diaphoresis, joint swelling, nausea, neck pain, numbness, rash, sore throat, swollen glands, urinary symptoms, vertigo, visual change, vomiting or weakness. Nothing aggravates the symptoms. She has tried acetaminophen for the symptoms. She states symptoms are bearable with OTC meds.    Past Medical History:   Diagnosis Date    Frequent UTI     Herpes     type 1    Pneumonia        Current Outpatient Medications   Medication Sig Dispense Refill    valACYclovir (VALTREX) 500 MG tablet take 1 tablet by mouth once daily 90 tablet 5    fluconazole (DIFLUCAN) 100 MG tablet Take 1 tablet x 2 days; then 1 in 72 hours if symptoms persist. (Patient not taking: Reported on 6/10/2022) 3 tablet 0     Current Facility-Administered Medications   Medication Dose Route Frequency Provider Last Rate Last Admin    etonogestrel (NEXPLANON) implant 68 mg  68 mg Subdermal Continuous PALAK De Anda CNP   68 mg at 10/25/21 1650    hpv vaccine (GARDASIL) injection 0.5 mL  0.5 mL IntraMUSCular Once Marbin Pimentel, DO Allergies   Allergen Reactions    No Known Allergies        Review of Systems:     Review of Systems   Constitutional: Positive for chills, fatigue and fever. Negative for activity change, appetite change, diaphoresis and unexpected weight change. HENT: Positive for sinus pressure. Negative for congestion, dental problem, drooling, ear discharge, ear pain, facial swelling, hearing loss, mouth sores, nosebleeds, postnasal drip, rhinorrhea, sinus pain, sneezing, sore throat, tinnitus, trouble swallowing and voice change. Eyes: Negative. Negative for photophobia, pain, discharge, redness, itching and visual disturbance. Respiratory: Negative. Negative for apnea, cough, choking, chest tightness, shortness of breath, wheezing and stridor. Cardiovascular: Negative. Negative for chest pain, palpitations and leg swelling. Gastrointestinal: Negative. Negative for abdominal distention, abdominal pain, anal bleeding, anorexia, blood in stool, change in bowel habit, constipation, diarrhea, nausea, rectal pain and vomiting. Musculoskeletal: Positive for myalgias. Negative for arthralgias, back pain, gait problem, joint swelling, neck pain and neck stiffness. Skin: Negative. Negative for color change, pallor, rash and wound. Neurological: Positive for headaches. Negative for dizziness, vertigo, tremors, seizures, syncope, facial asymmetry, speech difficulty, weakness, light-headedness and numbness. Physical Exam:      /72 (Site: Left Upper Arm, Position: Sitting, Cuff Size: Medium Adult)   Pulse (!) 105   Temp 99 °F (37.2 °C) (Tympanic)   SpO2 98%     Physical Exam  Vitals reviewed. Constitutional:       Appearance: Normal appearance. She is normal weight. HENT:      Head: Normocephalic and atraumatic.       Right Ear: Tympanic membrane, ear canal and external ear normal.      Left Ear: Tympanic membrane, ear canal and external ear normal.      Nose: Nose normal.      Mouth/Throat: Lips: Pink. Mouth: Mucous membranes are moist.      Pharynx: Oropharynx is clear. Uvula midline. Posterior oropharyngeal erythema present. No pharyngeal swelling, oropharyngeal exudate or uvula swelling. Tonsils: No tonsillar exudate or tonsillar abscesses. Eyes:      Extraocular Movements: Extraocular movements intact. Conjunctiva/sclera: Conjunctivae normal.      Pupils: Pupils are equal, round, and reactive to light. Cardiovascular:      Rate and Rhythm: Regular rhythm. Tachycardia present. Pulses: Normal pulses. Heart sounds: Normal heart sounds. Comments: Mild tachycardia    Pulmonary:      Effort: Pulmonary effort is normal.      Breath sounds: Normal air entry. Examination of the right-upper field reveals decreased breath sounds. Examination of the left-upper field reveals decreased breath sounds. Examination of the right-lower field reveals decreased breath sounds. Examination of the left-lower field reveals decreased breath sounds. Decreased breath sounds present. Abdominal:      General: Abdomen is flat. Bowel sounds are normal.      Palpations: Abdomen is soft. Musculoskeletal:         General: Normal range of motion. Cervical back: Normal range of motion and neck supple. Lymphadenopathy:      Cervical: Cervical adenopathy present. Skin:     General: Skin is warm and dry. Capillary Refill: Capillary refill takes less than 2 seconds. Neurological:      General: No focal deficit present. Mental Status: She is alert and oriented to person, place, and time. Mental status is at baseline. Psychiatric:         Mood and Affect: Mood normal.         Behavior: Behavior normal.         Plan:          1. Contact with and (suspected) exposure to covid-19  -     COVID-19     Continue over-the-counter medications as needed for symptoms. Use honey to the back of throat, salt water gargle as needed for sore throat, cough.   Go to the ER for shortness of breath, chest pain. Patient verbalized understanding. Follow Up Instructions:      Return if symptoms worsen or fail to improve, for SOB, chest pain go to ER. No orders of the defined types were placed in this encounter. Will send out COVID19 testing. Possible treatment alterations based on the results. Patient instructed to self-quarantine until testing results are back. Patient instructed not to return to work until results are back. Tylenol as needed for fever/pain. Encouraged adequate hydration and rest.  The patient indicates understanding of these issues and agrees with the plan. Educational materials provided on AVS.  Follow up if symptoms do not improve/worsen. Discussed symptoms that will warrant urgent ED evaluation/treatment. Patient and/or parent given educational materials - see patient instructions. Discussed use, benefit, and side effects of prescribed medications. All patient questions answered. Patient and/or parent voiced understanding.       Electronically signed by PALAK Rose 6/10/2022 at 1:05 PM

## 2022-06-10 NOTE — PATIENT INSTRUCTIONS
Patient Education        Learning About Coronavirus [phone]  What is coronavirus (COVID-19)? COVID-19 is a disease caused by a type of coronavirus. This illness was firstfound in December 2019. It has since spread worldwide. Coronaviruses are a large group of viruses. They cause the common cold. They also cause more serious illnesses like [place] respiratory syndrome (MERS) and severe acute respiratory syndrome (SARS). COVID-19 is caused by a novelcoronavirus. That means it's a new type that has not been seen in people before. What are the symptoms? COVID-19 symptoms may include:   Fever.  Cough.  Trouble breathing.  Chills or repeated shaking with chills.  Muscle and body aches.  Headache.  Sore throat.  New loss of taste or smell.  Vomiting.  Diarrhea. In severe cases, COVID-19 can cause pneumonia and make it hard to breathewithout help from a machine. It can cause death. How is it diagnosed? COVID-19 is diagnosed with a viral test. This may also be called a PCR test or antigen test. It looks for evidence of the virus in your breathing passages orlungs (respiratory system). The test is most often done on a sample from the nose, throat, or lungs. It's sometimes done on a sample of saliva. One way a sample is collected is byputting a long swab into the back of your nose. If you have questions about COVID-19 testing, ask your doctor or go to cdc.govto use the COVID-19 Viral Testing Tool. How is it treated? Mild cases of COVID-19 can be treated at home. Serious cases need treatment in the hospital. Treatment may include medicines to reduce symptoms, plus breathing support such as oxygen therapy or a ventilator. Some people may beplaced on their belly to help their oxygen levels. Treatments that may help people who have COVID-19 include:  Antiviral medicines. These medicines treat viral infections. Immune-based therapy. These medicines help the immune system fight COVID-19. Examples include monoclonal antibodies. Blood thinners. These medicines help prevent blood clots. People with severe illness are at risk for blood clots. How can you protect yourself and others?  Get vaccinated and boosted.  Avoid sick people and stay away from others if you are sick.  Stay at least 6 feet away from other people.  Avoid crowds, especially inside.  Get tested for COVID-19 before you have an indoor visit with people that don't live with you.  Cover your mouth with a tissue when you cough or sneeze.  Wash your hands often, especially after you cough or sneeze. Use soap and water, and scrub for at least 20 seconds. If soap and water aren't available, use an alcohol-based hand .  Avoid touching your mouth, nose, and eyes. Be sure to follow all instructions from the [facility] and your local health authorities. Here are some examples of specific precautions you may need totake.  If you are not fully vaccinated and boosted:  ? Wear a mask if you have to go to public areas.  Even if you're fully vaccinated and boosted, there's still a chance you can get and spread COVID-19. If you live in an area where COVID-19 is spreading quickly, wear a mask if you have to go to indoor public areas. You might also want to wear a mask in crowded outdoor areas as well as public indoor spaces if you:  ? Have certain health conditions. ? Live with someone who has a compromised immune system. ? Live with someone who is not fully vaccinated.  If you have been exposed to the virus and are not fully vaccinated and boosted:  ? Talk to your doctor as soon as you can. Your doctor might have you take medicine to help prevent serious illness. ? Get a COVID-19 test. You may need to be tested more than once. ? Stay home. Try to separate from other people where you live. Don't go to school, work, or public areas. ? Wear a mask around other people for a full 10 days.  Avoid travel and stay away from people at high risk for serious illness. ? Watch for symptoms.  If you have been exposed and you are fully vaccinated and boosted:  ? Talk to your doctor as soon as you can. Your doctor may have you take medicine to help prevent serious illness. ? Get a COVID-19 test. You may need to be tested more than once. ? Wear a mask around other people for a full 10 days. Avoid travel and stay away from people at high risk for serious illness. ? Watch for symptoms. If you're sick or test positive for COVID-19:   Talk to your doctor as soon as you can. Your doctor may have you take medicine to help prevent serious illness.  Get a COVID-19 test unless you have already been tested. You may need to be tested more than once.  Stay home. Leave only if you need to get medical care.  Wear a mask whenever you're around other people for a full 10 days.  Avoid travel and stay away from people at high risk for serious illness.  Limit contact with pets and people in your home. If possible, stay in a separate bedroom and use a separate bathroom.  Clean and disinfect your home every day. Use household  and disinfectant wipes or sprays. Take special care to clean things that you touch with your hands. If you have questions about COVID-19 testing, ask your doctor or go to cdc.govto use the COVID-19 Viral Testing Tool. How can you self-isolate when you have COVID-19? If you have COVID-19, there are things you can do to help avoid spreading thevirus to others.  Limit contact with people in your home. If possible, stay in a separate bedroom and use a separate bathroom.  Wear a mask when you are around other people.  If you have to leave home, avoid crowds and try to stay at least 6 feet away from other people.  Avoid contact with pets and other animals.  Cover your mouth and nose with a tissue when you cough or sneeze. Then throw it in the trash right away.    Wash your hands often, especially after you cough or sneeze. Use soap and water, and scrub for at least 20 seconds. If soap and water aren't available, use an alcohol-based hand .  Don't share personal household items. These include bedding, towels, cups and glasses, and eating utensils. [address] in the warmest water allowed for the fabric type, and dry it completely. It's okay to wash other people's laundry with yours.  Clean and disinfect your home. Use household  and disinfectant wipes or sprays. When should you call for help? Call 911 anytime you think you may need emergency care. For example, call if you have life-threatening symptoms, such as:     You have severe trouble breathing. (You can't talk at all.)      You have constant chest pain or pressure.      You are severely dizzy or lightheaded.      You are confused or can't think clearly.      You have pale, gray, or blue-colored skin or lips.      You pass out (lose consciousness) or are very hard to wake up.      You have loss of balance or trouble walking.      You have trouble seeing out of one or both eyes.      You have weakness or drooping on one side of the face.      You have weakness or numbness in an arm or a leg.      You have trouble speaking.      You have a severe headache.      You have a seizure. Call your doctor now or seek immediate medical care if:     You have moderate trouble breathing. (You can't speak a full sentence.)      You are coughing up blood.      You have signs of low blood pressure. These include feeling lightheaded; being too weak to stand; and having cold, pale, clammy skin. Watch closely for changes in your health, and be sure to contact your doctor if:     Your symptoms get worse.      You are not getting better as expected.      You have new or worse symptoms of anxiety, depression, nightmares, or flashbacks. Call before you go to the doctor's office. Follow their instructions. And wear a mask.   Where can you learn more?  Go to [email or web address] and sign in to your [employer] account. Enter C008 in the [facility] box to learn more about \"Learning About Coronavirus (COVID-19). \"     If you do not have an account, please click on the \"Sign Up Now\" link. Current as of: July 1, 2021               Content Version: 13.2  © 2006-2022 Healthwise, Incorporated. Care instructions adapted under license by [facility] ([facility]). If you have questions about a medical condition or this instruction, always ask your healthcare professional. [address] any warranty or liability for your use of this information.

## 2022-06-11 LAB
SARS-COV-2: ABNORMAL
SARS-COV-2: DETECTED
SOURCE: ABNORMAL

## 2023-04-24 RX ORDER — VALACYCLOVIR HYDROCHLORIDE 500 MG/1
TABLET, FILM COATED ORAL
Qty: 90 TABLET | Refills: 5 | Status: SHIPPED | OUTPATIENT
Start: 2023-04-24

## 2024-06-06 ENCOUNTER — HOSPITAL ENCOUNTER (OUTPATIENT)
Age: 24
Setting detail: SPECIMEN
Discharge: HOME | End: 2024-06-06

## 2024-06-06 ENCOUNTER — OFFICE VISIT (OUTPATIENT)
Dept: FAMILY MEDICINE CLINIC | Age: 24
End: 2024-06-06
Payer: COMMERCIAL

## 2024-06-06 VITALS
SYSTOLIC BLOOD PRESSURE: 100 MMHG | WEIGHT: 143.6 LBS | DIASTOLIC BLOOD PRESSURE: 70 MMHG | OXYGEN SATURATION: 94 % | HEART RATE: 81 BPM | HEIGHT: 68 IN | TEMPERATURE: 98.7 F | BODY MASS INDEX: 21.76 KG/M2

## 2024-06-06 DIAGNOSIS — M27.8 MASS OF JAW: ICD-10-CM

## 2024-06-06 DIAGNOSIS — R63.4 UNINTENTIONAL WEIGHT LOSS: ICD-10-CM

## 2024-06-06 DIAGNOSIS — Z00.00 ROUTINE GENERAL MEDICAL EXAMINATION AT A HEALTH CARE FACILITY: Primary | ICD-10-CM

## 2024-06-06 DIAGNOSIS — Z00.00 ROUTINE GENERAL MEDICAL EXAMINATION AT A HEALTH CARE FACILITY: ICD-10-CM

## 2024-06-06 LAB
ANION GAP SERPL CALCULATED.3IONS-SCNC: 10 MMOL/L (ref 9–16)
BASOPHILS # BLD: 0.05 K/UL (ref 0–0.2)
BASOPHILS NFR BLD: 1 % (ref 0–2)
BUN SERPL-MCNC: 11 MG/DL (ref 6–20)
CALCIUM SERPL-MCNC: 9.5 MG/DL (ref 8.6–10.4)
CHLORIDE SERPL-SCNC: 106 MMOL/L (ref 98–107)
CO2 SERPL-SCNC: 24 MMOL/L (ref 20–31)
CREAT SERPL-MCNC: 0.7 MG/DL (ref 0.5–0.9)
EOSINOPHIL # BLD: 0.13 K/UL (ref 0–0.44)
EOSINOPHILS RELATIVE PERCENT: 3 % (ref 1–4)
ERYTHROCYTE [DISTWIDTH] IN BLOOD BY AUTOMATED COUNT: 12.6 % (ref 11.8–14.4)
EST. AVERAGE GLUCOSE BLD GHB EST-MCNC: 94 MG/DL
GFR, ESTIMATED: >90 ML/MIN/1.73M2
GLUCOSE SERPL-MCNC: 84 MG/DL (ref 74–99)
HBA1C MFR BLD: 4.9 % (ref 4–6)
HCT VFR BLD AUTO: 38.2 % (ref 36.3–47.1)
HGB BLD-MCNC: 12.6 G/DL (ref 11.9–15.1)
IMM GRANULOCYTES # BLD AUTO: <0.03 K/UL (ref 0–0.3)
IMM GRANULOCYTES NFR BLD: 0 %
LYMPHOCYTES NFR BLD: 1.42 K/UL (ref 1.1–3.7)
LYMPHOCYTES RELATIVE PERCENT: 27 % (ref 24–43)
MCH RBC QN AUTO: 31.3 PG (ref 25.2–33.5)
MCHC RBC AUTO-ENTMCNC: 33 G/DL (ref 28.4–34.8)
MCV RBC AUTO: 94.8 FL (ref 82.6–102.9)
MONOCYTES NFR BLD: 0.51 K/UL (ref 0.1–1.2)
MONOCYTES NFR BLD: 10 % (ref 3–12)
NEUTROPHILS NFR BLD: 59 % (ref 36–65)
NEUTS SEG NFR BLD: 3.16 K/UL (ref 1.5–8.1)
NRBC BLD-RTO: 0 PER 100 WBC
PLATELET # BLD AUTO: 262 K/UL (ref 138–453)
PMV BLD AUTO: 11.6 FL (ref 8.1–13.5)
POTASSIUM SERPL-SCNC: 4.9 MMOL/L (ref 3.7–5.3)
RBC # BLD AUTO: 4.03 M/UL (ref 3.95–5.11)
SODIUM SERPL-SCNC: 140 MMOL/L (ref 136–145)
TSH SERPL DL<=0.05 MIU/L-ACNC: 2.06 UIU/ML (ref 0.27–4.2)
WBC OTHER # BLD: 5.3 K/UL (ref 3.5–11.3)

## 2024-06-06 PROCEDURE — 99395 PREV VISIT EST AGE 18-39: CPT | Performed by: NURSE PRACTITIONER

## 2024-06-06 RX ORDER — VALACYCLOVIR HYDROCHLORIDE 500 MG/1
500 TABLET, FILM COATED ORAL DAILY
Qty: 90 TABLET | Refills: 5 | Status: SHIPPED | OUTPATIENT
Start: 2024-06-06

## 2024-06-06 SDOH — ECONOMIC STABILITY: HOUSING INSECURITY
IN THE LAST 12 MONTHS, WAS THERE A TIME WHEN YOU DID NOT HAVE A STEADY PLACE TO SLEEP OR SLEPT IN A SHELTER (INCLUDING NOW)?: NO

## 2024-06-06 SDOH — ECONOMIC STABILITY: FOOD INSECURITY: WITHIN THE PAST 12 MONTHS, THE FOOD YOU BOUGHT JUST DIDN'T LAST AND YOU DIDN'T HAVE MONEY TO GET MORE.: NEVER TRUE

## 2024-06-06 SDOH — ECONOMIC STABILITY: FOOD INSECURITY: WITHIN THE PAST 12 MONTHS, YOU WORRIED THAT YOUR FOOD WOULD RUN OUT BEFORE YOU GOT MONEY TO BUY MORE.: NEVER TRUE

## 2024-06-06 SDOH — ECONOMIC STABILITY: INCOME INSECURITY: HOW HARD IS IT FOR YOU TO PAY FOR THE VERY BASICS LIKE FOOD, HOUSING, MEDICAL CARE, AND HEATING?: NOT HARD AT ALL

## 2024-06-06 ASSESSMENT — PATIENT HEALTH QUESTIONNAIRE - PHQ9
6. FEELING BAD ABOUT YOURSELF - OR THAT YOU ARE A FAILURE OR HAVE LET YOURSELF OR YOUR FAMILY DOWN: NOT AT ALL
8. MOVING OR SPEAKING SO SLOWLY THAT OTHER PEOPLE COULD HAVE NOTICED. OR THE OPPOSITE, BEING SO FIGETY OR RESTLESS THAT YOU HAVE BEEN MOVING AROUND A LOT MORE THAN USUAL: NOT AT ALL
7. TROUBLE CONCENTRATING ON THINGS, SUCH AS READING THE NEWSPAPER OR WATCHING TELEVISION: NOT AT ALL
9. THOUGHTS THAT YOU WOULD BE BETTER OFF DEAD, OR OF HURTING YOURSELF: NOT AT ALL
5. POOR APPETITE OR OVEREATING: NOT AT ALL
10. IF YOU CHECKED OFF ANY PROBLEMS, HOW DIFFICULT HAVE THESE PROBLEMS MADE IT FOR YOU TO DO YOUR WORK, TAKE CARE OF THINGS AT HOME, OR GET ALONG WITH OTHER PEOPLE: NOT DIFFICULT AT ALL
SUM OF ALL RESPONSES TO PHQ9 QUESTIONS 1 & 2: 0
1. LITTLE INTEREST OR PLEASURE IN DOING THINGS: NOT AT ALL
SUM OF ALL RESPONSES TO PHQ QUESTIONS 1-9: 0
4. FEELING TIRED OR HAVING LITTLE ENERGY: NOT AT ALL
SUM OF ALL RESPONSES TO PHQ QUESTIONS 1-9: 0
2. FEELING DOWN, DEPRESSED OR HOPELESS: NOT AT ALL
3. TROUBLE FALLING OR STAYING ASLEEP: NOT AT ALL

## 2024-06-06 ASSESSMENT — ENCOUNTER SYMPTOMS
COUGH: 0
COLOR CHANGE: 0
ABDOMINAL PAIN: 0
VOMITING: 0
SHORTNESS OF BREATH: 0
CHEST TIGHTNESS: 0
DIARRHEA: 0
CONSTIPATION: 0
SORE THROAT: 0
SINUS PRESSURE: 0
NAUSEA: 0

## 2024-06-06 NOTE — PROGRESS NOTES
Visit Information    Have you changed or started any medications since your last visit including any over-the-counter medicines, vitamins, or herbal medicines? no   Have you stopped taking any of your medications? Is so, why? -  no  Are you having any side effects from any of your medications? - no    Have you seen any other physician or provider since your last visit?  no   Have you had any other diagnostic tests since your last visit?  no   Have you been seen in the emergency room and/or had an admission in a hospital since we last saw you?  no   Have you had your routine dental cleaning in the past 6 months?  no     Do you have an active MyChart account? If no, what is the barrier?  Yes    Patient Care Team:  Kathryn Milton APRN - CNP as PCP - General (Family Nurse Practitioner)  Kathryn Milton APRN - CNP as PCP - Empaneled Provider    Medical History Review  Past Medical, Family, and Social History reviewed and  contribute to the patient presenting condition    Health Maintenance   Topic Date Due    COVID-19 Vaccine (1) Never done    Depression Monitoring  Never done    Chlamydia/GC screen  Never done    Pap smear  Never done    Flu vaccine (Season Ended) 08/01/2024    DTaP/Tdap/Td vaccine (9 - Td or Tdap) 08/14/2029    Hepatitis A vaccine  Completed    Hepatitis B vaccine  Completed    Hib vaccine  Completed    HPV vaccine  Completed    Polio vaccine  Completed    Varicella vaccine  Completed    Meningococcal (ACWY) vaccine  Completed    Pneumococcal 0-64 years Vaccine  Aged Out    Hepatitis C screen  Discontinued    HIV screen  Discontinued             
  Effort: Pulmonary effort is normal.      Breath sounds: Normal breath sounds. No wheezing.   Abdominal:      General: Bowel sounds are normal.      Palpations: Abdomen is soft.      Tenderness: There is no abdominal tenderness.   Musculoskeletal:         General: Normal range of motion.      Cervical back: Normal range of motion and neck supple.      Right lower leg: No edema.      Left lower leg: No edema.   Lymphadenopathy:      Cervical: No cervical adenopathy.   Skin:     General: Skin is warm and dry.      Findings: No erythema or rash.   Neurological:      General: No focal deficit present.      Mental Status: She is alert and oriented to person, place, and time. Mental status is at baseline.      Motor: No weakness.      Gait: Gait normal.   Psychiatric:         Mood and Affect: Mood normal.         Behavior: Behavior normal.         Thought Content: Thought content normal.         Judgment: Judgment normal.              No data to display                No results found for: \"CHOL\", \"CHOLFAST\", \"TRIG\", \"TRIGLYCFAST\", \"HDL\", \"GLUF\", \"GLUCOSE\", \"LABA1C\"    The ASCVD Risk score (Edgardo DK, et al., 2019) failed to calculate for the following reasons:    The 2019 ASCVD risk score is only valid for ages 40 to 79    Immunization History   Administered Date(s) Administered    DTaP 2001, 2001, 2001, 2001, 2001, 2001, 09/09/2002, 09/09/2002, 05/10/2006, 05/10/2006    HPV Quadrivalent (Gardasil) 06/20/2019    HPV, GARDASIL 9, (age 9y-45y), IM, 0.5mL 08/14/2019, 12/23/2019    Hep A, HAVRIX, VAQTA, (age 12m-18y), IM, 0.5mL 07/02/2007, 07/21/2008    Hep A, HAVRIX, VAQTA, (age 19y+), IM, 1mL 07/02/2007, 07/21/2008    Hep B, ENGERIX-B, RECOMBIVAX-HB, (age Birth - 19y), IM, 0.5mL 2001, 2001, 2001, 2001, 06/10/2002, 06/10/2002    Hepatitis A 07/02/2007, 07/21/2008    Hepatitis B 2001, 2001, 06/10/2002    Hib PRP-OMP, PEDVAXHIB, (age 2m-6y, Adlt Risk),

## 2024-06-13 ENCOUNTER — HOSPITAL ENCOUNTER (OUTPATIENT)
Dept: ULTRASOUND IMAGING | Age: 24
Discharge: HOME | End: 2024-06-15
Payer: COMMERCIAL

## 2024-06-13 DIAGNOSIS — M27.8 MASS OF JAW: ICD-10-CM

## 2024-06-13 PROCEDURE — 76536 US EXAM OF HEAD AND NECK: CPT

## 2024-06-27 ENCOUNTER — OFFICE VISIT (OUTPATIENT)
Dept: FAMILY MEDICINE CLINIC | Age: 24
End: 2024-06-27
Payer: COMMERCIAL

## 2024-06-27 VITALS
BODY MASS INDEX: 21.89 KG/M2 | WEIGHT: 144.4 LBS | SYSTOLIC BLOOD PRESSURE: 98 MMHG | HEART RATE: 74 BPM | DIASTOLIC BLOOD PRESSURE: 60 MMHG | OXYGEN SATURATION: 98 % | TEMPERATURE: 97.9 F | HEIGHT: 68 IN

## 2024-06-27 DIAGNOSIS — L72.0 EPIDERMAL INCLUSION CYST: ICD-10-CM

## 2024-06-27 DIAGNOSIS — M27.8 MASS OF JAW: Primary | ICD-10-CM

## 2024-06-27 PROCEDURE — 99213 OFFICE O/P EST LOW 20 MIN: CPT | Performed by: NURSE PRACTITIONER

## 2024-06-27 ASSESSMENT — ENCOUNTER SYMPTOMS
VOICE CHANGE: 0
SORE THROAT: 0
SHORTNESS OF BREATH: 0
TROUBLE SWALLOWING: 0
NAUSEA: 0
ABDOMINAL PAIN: 0
CHEST TIGHTNESS: 0
COUGH: 0
VOMITING: 0

## 2024-06-27 NOTE — PROGRESS NOTES
Visit Information    Have you changed or started any medications since your last visit including any over-the-counter medicines, vitamins, or herbal medicines? no   Have you stopped taking any of your medications? Is so, why? -  no  Are you having any side effects from any of your medications? - no    Have you seen any other physician or provider since your last visit?  no   Have you had any other diagnostic tests since your last visit?  no   Have you been seen in the emergency room and/or had an admission in a hospital since we last saw you?  no   Have you had your routine dental cleaning in the past 6 months?  no     Do you have an active MyChart account? If no, what is the barrier?  Yes    Patient Care Team:  Kathryn Milton APRN - CNP as PCP - General (Family Nurse Practitioner)  Kathryn Milton APRN - CNP as PCP - Empaneled Provider    Medical History Review  Past Medical, Family, and Social History reviewed and  contribute to the patient presenting condition    Health Maintenance   Topic Date Due    COVID-19 Vaccine (1) Never done    Chlamydia/GC screen  Never done    Pap smear  Never done    Flu vaccine (Season Ended) 08/01/2024    Depression Monitoring  06/06/2025    DTaP/Tdap/Td vaccine (9 - Td or Tdap) 08/14/2029    Hepatitis A vaccine  Completed    Hepatitis B vaccine  Completed    Hib vaccine  Completed    HPV vaccine  Completed    Polio vaccine  Completed    Varicella vaccine  Completed    Meningococcal (ACWY) vaccine  Completed    Pneumococcal 0-64 years Vaccine  Aged Out    Hepatitis C screen  Discontinued    HIV screen  Discontinued             
[place]        US HEAD NECK SOFT TISSUE THYROID  Order: [id number]  Status: Final result       Visible to patient: Yes (seen)       Next appt: 09/20/2024 at 11:00 AM in Obstetrics and Gynecology (Dana Pennington MD)       Dx: Mass of jaw    3 Result Notes  Details    Reading Physician Reading Date Result Priority   Raghu Koroma MD  [phone] 6/13/2024      Narrative & Impression  EXAMINATION:  HEAD AND NECK SOFT TISSUE ULTRASOUND     6/13/2024     COMPARISON:  None.     HISTORY:  ORDERING SYSTEM PROVIDED HISTORY: Mass of jaw  TECHNOLOGIST PROVIDED HISTORY:  cyst vs lymph node front of right ear, anival     FINDINGS:  Direct scanning was performed over an apparently palpable lump in the right  jaw line in front of the ear.     Corresponding to the palpable lump is an ovoid heterogeneous predominately  hypoechoic structure measuring 21.8 x 13.6 x 15.7 mm.  No significant color  flow was seen.  There is through transmission.  Although nonspecific this may  represent a complex cystic structure such as epidermal inclusion or sebaceous  cyst.  Other possibilities cannot be excluded.  It does not appear to be  related to a salivary gland.  Consider MRI if additional imaging or precise  anatomic localization is warranted clinically.     IMPRESSION:  2.2 cm ovoid complex cystic structure corresponding to a palpable lump,  nonspecific but could represent an epidermal inclusion cyst/sebaceous cyst.        Lab Results   Component Value Date    WBC 5.3 06/06/2024    HGB 12.6 06/06/2024    HCT 38.2 06/06/2024    MCV 94.8 06/06/2024     06/06/2024     Lab Results   Component Value Date    TSH 2.06 06/06/2024       Chemistry        Component Value Date/Time     06/06/2024 0840    K 4.9 06/06/2024 0840     06/06/2024 0840    CO2 24 06/06/2024 0840    BUN 11 06/06/2024 0840    CREATININE 0.7 06/06/2024 0840        Component Value Date/Time    CALCIUM 9.5 06/06/2024 0840        Hemoglobin A1C   Date

## 2024-07-06 ENCOUNTER — PATIENT MESSAGE (OUTPATIENT)
Dept: FAMILY MEDICINE CLINIC | Age: 24
End: 2024-07-06

## 2024-07-07 RX ORDER — ESCITALOPRAM OXALATE 10 MG/1
10 TABLET ORAL DAILY
Qty: 30 TABLET | Refills: 5 | Status: SHIPPED | OUTPATIENT
Start: 2024-07-07

## 2024-09-20 ENCOUNTER — HOSPITAL ENCOUNTER (OUTPATIENT)
Age: 24
Setting detail: SPECIMEN
Discharge: HOME | End: 2024-09-20

## 2024-09-20 ENCOUNTER — OFFICE VISIT (OUTPATIENT)
Dept: OBGYN CLINIC | Age: 24
End: 2024-09-20

## 2024-09-20 VITALS
SYSTOLIC BLOOD PRESSURE: 103 MMHG | DIASTOLIC BLOOD PRESSURE: 67 MMHG | WEIGHT: 141 LBS | HEART RATE: 74 BPM | BODY MASS INDEX: 22.13 KG/M2 | HEIGHT: 67 IN

## 2024-09-20 DIAGNOSIS — Z01.419 ENCOUNTER FOR GYNECOLOGICAL EXAMINATION: ICD-10-CM

## 2024-09-20 DIAGNOSIS — Z76.89 ENCOUNTER TO ESTABLISH CARE WITH NEW DOCTOR: Primary | ICD-10-CM

## 2024-09-20 ASSESSMENT — ENCOUNTER SYMPTOMS
SHORTNESS OF BREATH: 0
COUGH: 0
BACK PAIN: 0
ABDOMINAL PAIN: 0

## 2024-09-27 LAB — CYTOLOGY REPORT: NORMAL

## 2024-10-01 LAB
HPV I/H RISK 4 DNA CVX QL NAA+PROBE: NOT DETECTED
HPV SAMPLE: NORMAL
HPV, INTERPRETATION: NORMAL
HPV16 DNA CVX QL NAA+PROBE: NOT DETECTED
HPV18 DNA CVX QL NAA+PROBE: NOT DETECTED
SPECIMEN DESCRIPTION: NORMAL

## 2024-10-29 ENCOUNTER — PROCEDURE VISIT (OUTPATIENT)
Dept: OBGYN CLINIC | Age: 24
End: 2024-10-29
Payer: COMMERCIAL

## 2024-10-29 VITALS — HEIGHT: 67 IN | BODY MASS INDEX: 21.66 KG/M2 | WEIGHT: 138 LBS

## 2024-10-29 DIAGNOSIS — Z30.46 ENCOUNTER FOR REMOVAL AND REINSERTION OF NEXPLANON: Primary | ICD-10-CM

## 2024-10-29 PROCEDURE — 11982 REMOVE DRUG IMPLANT DEVICE: CPT | Performed by: OBSTETRICS & GYNECOLOGY

## 2024-10-29 PROCEDURE — 11981 INSERTION DRUG DLVR IMPLANT: CPT | Performed by: OBSTETRICS & GYNECOLOGY

## 2024-10-29 NOTE — PROGRESS NOTES
[facility]  [address]  [facility] [address]  Dept: [phone]  10/29/24      Chief Complaint   Patient presents with    Procedure     Nexplanon removal & reinsertion        Gee is a 25 yo female who presents for Nexplanon removal and replacement.  She is doing well with her current Nexplanon which controls her painful periods.  She has a history of very painful periods and would like to have a new Nexplanon placed to help manage her periods.      Past Medical History:   Diagnosis Date    Frequent UTI     Herpes     type 1    Pneumonia          Past Surgical History:   Procedure Laterality Date    INSERTION OF CONTRACEPTIVE CAPSULE  10/25/2021    Nexplanon    TONSILLECTOMY  2021       Family History   Problem Relation Age of Onset    No Known Problems Mother     No Known Problems Father     No Known Problems Brother        Social History     Tobacco Use    Smoking status: Never     Passive exposure: Never    Smokeless tobacco: Never   Vaping Use    Vaping status: Every Day    Substances: Nicotine, THC, CBD, Flavoring   Substance Use Topics    Alcohol use: Yes     Alcohol/week: 0.0 standard drinks of alcohol    Drug use: Yes     Types: Marijuana (Weed)       Current Outpatient Medications on File Prior to Visit   Medication Sig Dispense Refill    escitalopram (LEXAPRO) 10 MG tablet Take 1 tablet by mouth daily 30 tablet 5    valACYclovir (VALTREX) 500 MG tablet Take 1 tablet by mouth daily 90 tablet 5     Current Facility-Administered Medications on File Prior to Visit   Medication Dose Route Frequency Provider Last Rate Last Admin    etonogestrel (NEXPLANON) implant 68 mg  68 mg Subdermal Continuous Anne-Marie Linn APRN - CNP   68 mg at 10/25/21 2554    hpv vaccine (GARDASIL) injection 0.5 mL  0.5 mL IntraMUSCular Once Orin Bull DO           Allergies as of 10/29/2024 - Fully Reviewed 10/29/2024   Allergen Reaction

## 2024-11-04 DIAGNOSIS — L08.9 SKIN INFECTION: Primary | ICD-10-CM

## 2024-11-05 RX ORDER — SULFAMETHOXAZOLE AND TRIMETHOPRIM 800; 160 MG/1; MG/1
1 TABLET ORAL 2 TIMES DAILY
Qty: 10 TABLET | Refills: 0 | Status: SHIPPED | OUTPATIENT
Start: 2024-11-05 | End: 2024-11-10

## 2025-06-12 ENCOUNTER — OFFICE VISIT (OUTPATIENT)
Dept: FAMILY MEDICINE CLINIC | Age: 25
End: 2025-06-12
Payer: COMMERCIAL

## 2025-06-12 VITALS
HEIGHT: 67 IN | BODY MASS INDEX: 24.48 KG/M2 | SYSTOLIC BLOOD PRESSURE: 104 MMHG | HEART RATE: 94 BPM | TEMPERATURE: 97 F | DIASTOLIC BLOOD PRESSURE: 62 MMHG | WEIGHT: 156 LBS | OXYGEN SATURATION: 99 %

## 2025-06-12 DIAGNOSIS — Z00.00 ROUTINE GENERAL MEDICAL EXAMINATION AT A HEALTH CARE FACILITY: Primary | ICD-10-CM

## 2025-06-12 PROCEDURE — 99395 PREV VISIT EST AGE 18-39: CPT | Performed by: NURSE PRACTITIONER

## 2025-06-12 SDOH — ECONOMIC STABILITY: FOOD INSECURITY: WITHIN THE PAST 12 MONTHS, YOU WORRIED THAT YOUR FOOD WOULD RUN OUT BEFORE YOU GOT MONEY TO BUY MORE.: NEVER TRUE

## 2025-06-12 SDOH — ECONOMIC STABILITY: FOOD INSECURITY: WITHIN THE PAST 12 MONTHS, THE FOOD YOU BOUGHT JUST DIDN'T LAST AND YOU DIDN'T HAVE MONEY TO GET MORE.: NEVER TRUE

## 2025-06-12 ASSESSMENT — PATIENT HEALTH QUESTIONNAIRE - PHQ9
10. IF YOU CHECKED OFF ANY PROBLEMS, HOW DIFFICULT HAVE THESE PROBLEMS MADE IT FOR YOU TO DO YOUR WORK, TAKE CARE OF THINGS AT HOME, OR GET ALONG WITH OTHER PEOPLE: NOT DIFFICULT AT ALL
6. FEELING BAD ABOUT YOURSELF - OR THAT YOU ARE A FAILURE OR HAVE LET YOURSELF OR YOUR FAMILY DOWN: NOT AT ALL
7. TROUBLE CONCENTRATING ON THINGS, SUCH AS READING THE NEWSPAPER OR WATCHING TELEVISION: NOT AT ALL
2. FEELING DOWN, DEPRESSED OR HOPELESS: NOT AT ALL
SUM OF ALL RESPONSES TO PHQ QUESTIONS 1-9: 0
4. FEELING TIRED OR HAVING LITTLE ENERGY: NOT AT ALL
SUM OF ALL RESPONSES TO PHQ QUESTIONS 1-9: 0
1. LITTLE INTEREST OR PLEASURE IN DOING THINGS: NOT AT ALL
SUM OF ALL RESPONSES TO PHQ QUESTIONS 1-9: 0
8. MOVING OR SPEAKING SO SLOWLY THAT OTHER PEOPLE COULD HAVE NOTICED. OR THE OPPOSITE, BEING SO FIGETY OR RESTLESS THAT YOU HAVE BEEN MOVING AROUND A LOT MORE THAN USUAL: NOT AT ALL
9. THOUGHTS THAT YOU WOULD BE BETTER OFF DEAD, OR OF HURTING YOURSELF: NOT AT ALL
SUM OF ALL RESPONSES TO PHQ QUESTIONS 1-9: 0
3. TROUBLE FALLING OR STAYING ASLEEP: NOT AT ALL
5. POOR APPETITE OR OVEREATING: NOT AT ALL

## 2025-06-12 ASSESSMENT — ENCOUNTER SYMPTOMS
NAUSEA: 0
SINUS PRESSURE: 0
ABDOMINAL PAIN: 0
DIARRHEA: 0
SHORTNESS OF BREATH: 0
CONSTIPATION: 0
COLOR CHANGE: 0
TROUBLE SWALLOWING: 0
COUGH: 0
CHEST TIGHTNESS: 0
SORE THROAT: 0

## 2025-06-12 NOTE — PROGRESS NOTES
Visit Information    Have you changed or started any medications since your last visit including any over-the-counter medicines, vitamins, or herbal medicines? no   Have you stopped taking any of your medications? Is so, why? -  no  Are you having any side effects from any of your medications? - no    Have you seen any other physician or provider since your last visit?  no   Have you had any other diagnostic tests since your last visit?  no   Have you been seen in the emergency room and/or had an admission in a hospital since we last saw you?  no   Have you had your routine dental cleaning in the past 6 months?  no     Do you have an active MyChart account? If no, what is the barrier?  Yes    Patient Care Team:  Kathryn Milton APRN - CNP as PCP - General (Family Nurse Practitioner)  Kathryn Milton APRN - CNP as PCP - Empaneled Provider    Medical History Review  Past Medical, Family, and Social History reviewed and  contribute to the patient presenting condition    Health Maintenance   Topic Date Due    Chlamydia/GC screen  Never done    COVID-19 Vaccine (1 - 2024-25 season) Never done    Depression Monitoring  06/06/2025    Flu vaccine (Season Ended) 08/01/2025    Pap smear  09/20/2027    DTaP/Tdap/Td vaccine (9 - Td or Tdap) 08/14/2029    Hepatitis A vaccine  Completed    Hepatitis B vaccine  Completed    Hib vaccine  Completed    HPV vaccine  Completed    Polio vaccine  Completed    Varicella vaccine  Completed    Meningococcal (ACWY) vaccine  Completed    Meningococcal B vaccine  Completed    Pneumococcal 0-49 years Vaccine  Aged Out    Hepatitis C screen  Discontinued    HIV screen  Discontinued

## 2025-06-12 NOTE — PROGRESS NOTES
2025    Gee Martinez (:  2000) is a 25 y.o. female, here for a preventive medicine evaluation.  No concerns at this time.  She is taking Valtrex as needed for cold sores prevention.  States she recently got a promotion at work and moved into a new house with her dog and is feeling great overall.  No concerns today mood is stable.  She is walking her dog regularly for exercise but is not following any special diet.      Please note that this chart was generated using voice recognition Dragon dictation software.  Although every effort was made to ensure the accuracy of this automated transcription, some errors in transcription may have occurred.    Subjective   Patient Active Problem List   Diagnosis    Herpes simplex type 1 infection    Depression       Review of Systems   Constitutional:  Negative for activity change, appetite change, chills, fatigue, fever and unexpected weight change.   HENT:  Negative for congestion, ear pain, postnasal drip, sinus pressure, sore throat and trouble swallowing.    Eyes:  Negative for visual disturbance.   Respiratory:  Negative for cough, chest tightness and shortness of breath.    Cardiovascular:  Negative for chest pain and leg swelling.   Gastrointestinal:  Negative for abdominal pain, constipation, diarrhea and nausea.   Endocrine: Negative for polydipsia, polyphagia and polyuria.   Genitourinary:  Negative for difficulty urinating, dyspareunia, dysuria, frequency, genital sores, menstrual problem, pelvic pain, vaginal bleeding, vaginal discharge and vaginal pain.   Musculoskeletal:  Negative for arthralgias and myalgias.   Skin:  Negative for color change and rash.   Neurological:  Negative for dizziness, weakness, numbness and headaches.   Hematological:  Negative for adenopathy.   Psychiatric/Behavioral:  Negative for dysphoric mood and sleep disturbance. The patient is not nervous/anxious.        Prior to Visit Medications    Medication Sig Taking?